# Patient Record
Sex: FEMALE | Race: BLACK OR AFRICAN AMERICAN | NOT HISPANIC OR LATINO | Employment: UNEMPLOYED | URBAN - METROPOLITAN AREA
[De-identification: names, ages, dates, MRNs, and addresses within clinical notes are randomized per-mention and may not be internally consistent; named-entity substitution may affect disease eponyms.]

---

## 2018-07-11 ENCOUNTER — OFFICE VISIT (OUTPATIENT)
Dept: INTERNAL MEDICINE | Facility: CLINIC | Age: 38
End: 2018-07-11

## 2018-07-11 VITALS
HEIGHT: 59 IN | DIASTOLIC BLOOD PRESSURE: 100 MMHG | WEIGHT: 247 LBS | BODY MASS INDEX: 49.8 KG/M2 | SYSTOLIC BLOOD PRESSURE: 160 MMHG

## 2018-07-11 DIAGNOSIS — M79.672 CHRONIC FOOT PAIN, LEFT: Primary | ICD-10-CM

## 2018-07-11 DIAGNOSIS — G89.29 CHRONIC FOOT PAIN, LEFT: Primary | ICD-10-CM

## 2018-07-11 PROCEDURE — 99203 OFFICE O/P NEW LOW 30 MIN: CPT | Performed by: FAMILY MEDICINE

## 2018-07-11 NOTE — PATIENT INSTRUCTIONS
Thank you for coming in today.  Please bring your medical records from your previous primary care provider.  I have ordered an orthopedic surgery referral for you.  You will be called to set up an appointment with this specialist.  Please follow-up as indicated.  Return to the clinic sooner if you have any other concerns.

## 2018-07-11 NOTE — PROGRESS NOTES
Subjective   Solange Archer is a 37 y.o. female who presents to the clinic to establish care and for complaint of Foot Pain      History of Present Illness  She reports that her previous PCP was Macie Matt NP at [facility].  Transferred care due to previous PCP relocating.  Specialists include: None  Prescription medications include: None  OTC medications include: Zyrtec, Tylenol    Patient reports complaint of chronic left foot pain, starting about one year ago after an accidental injury.  Reports pain on the dorsal surface of her foot that occurred acutely after a walker fell on her foot.  Pain is intermittent and worsens with prolonged walking.  Reports associated intermittent swelling, but denies associated erythema, bruising, numbness, weakness.  States that her last PCP ordered an xray of her left foot, which was reportedly unremarkable.  Has been taking Tylenol or using Icy Hot, which helps.  Has also used a foot brace, which also helps.    Review of Systems   Constitutional: Negative for chills, fatigue and fever.   HENT: Negative for congestion, ear pain, rhinorrhea, sinus pressure and sore throat.    Eyes: Negative for pain and visual disturbance.   Respiratory: Negative for cough and shortness of breath.    Cardiovascular: Negative for chest pain and palpitations.   Gastrointestinal: Negative for abdominal pain, constipation, diarrhea, nausea and vomiting.   Genitourinary: Negative for decreased urine volume, dysuria and hematuria.   Musculoskeletal: Negative for back pain and gait problem.        Positive for chronic left foot pain.   Skin: Negative for color change, pallor and rash.   Neurological: Negative for dizziness, syncope, weakness, numbness and headaches.   Psychiatric/Behavioral: The patient is not nervous/anxious.         Negative for depressed mood.     All other systems reviewed and are negative.    Past Medical History:   Diagnosis Date   • Seasonal allergies        Family History  "  Problem Relation Age of Onset   • Heart attack Mother    • Hypertension Mother    • Migraines Sister    • Diabetes Other    • Migraines Other        History reviewed. No pertinent surgical history.    Social History     Social History   • Marital status: Single     Spouse name: N/A   • Number of children: N/A   • Years of education: N/A     Occupational History   • Not on file.     Social History Main Topics   • Smoking status: Never Smoker   • Smokeless tobacco: Never Used   • Alcohol use No   • Drug use: No   • Sexual activity: No     Other Topics Concern   • Not on file     Social History Narrative   • No narrative on file       No current outpatient prescriptions on file.    Objective   /100   Ht 149.9 cm (59\")   Wt 112 kg (247 lb)   LMP 07/02/2018   BMI 49.89 kg/m²      Physical Exam   Constitutional: She is oriented to person, place, and time. She appears well-developed and well-nourished.   No acute distress.   HENT:   Head: Normocephalic and atraumatic.   Right Ear: External ear normal.   Left Ear: External ear normal.   Nose: Nose normal.   Eyes: Conjunctivae and EOM are normal.   Neck: Normal range of motion. Neck supple.   Cardiovascular: Normal rate, regular rhythm, normal heart sounds and intact distal pulses.    Pulses:       Dorsalis pedis pulses are 2+ on the left side.   Pulmonary/Chest: Effort normal and breath sounds normal. No respiratory distress. She has no wheezes.   Abdominal: Soft. There is no tenderness.   Musculoskeletal: Normal range of motion.   Normal gait.        Neurological: She is alert and oriented to person, place, and time.   Skin: Skin is warm and dry. No bruising noted. No erythema.   Psychiatric: She has a normal mood and affect. Her behavior is normal.   Vitals reviewed.      Assessment/Plan   Solange was seen today for foot pain.    Diagnoses and all orders for this visit:    Chronic foot pain, left  -     Ambulatory Referral to Orthopedic Surgery    Considering the " chronicity of her symptoms and reported unremarkable left foot xray, will order a referral for orthopedic surgery today to aid with further evaluation and management.

## 2018-08-17 ENCOUNTER — OFFICE VISIT (OUTPATIENT)
Dept: ORTHOPEDIC SURGERY | Facility: CLINIC | Age: 38
End: 2018-08-17

## 2018-08-17 VITALS — BODY MASS INDEX: 48.04 KG/M2 | WEIGHT: 244.71 LBS | OXYGEN SATURATION: 98 % | HEART RATE: 64 BPM | HEIGHT: 60 IN

## 2018-08-17 DIAGNOSIS — M79.672 LEFT FOOT PAIN: Primary | ICD-10-CM

## 2018-08-17 PROCEDURE — 99204 OFFICE O/P NEW MOD 45 MIN: CPT | Performed by: PHYSICIAN ASSISTANT

## 2018-08-17 RX ORDER — ACETAMINOPHEN 500 MG
500 TABLET ORAL EVERY 6 HOURS PRN
COMMUNITY

## 2018-08-17 RX ORDER — IBUPROFEN 200 MG
200 TABLET ORAL EVERY 6 HOURS PRN
COMMUNITY

## 2018-08-17 NOTE — PROGRESS NOTES
[facility] [place] [facility] [place]    Subjective     Patient: Solange Archer  : 1980    Primary Care Provider: Alexandra Simpson MD    Requesting Provider: As above    Pain and Edema of the Left Foot      History    Chief Complaint: Left dorsal foot pain    History of Present Illness: This is a very pleasant 37-year-old female here with her mother with complaints of left dorsal foot pain since her grandmother's walker fell on it one year ago.  She reports pain 8-10/10 with weightbearing and walking as well as aching and sharp rest pain and night pain.  Initially, there was swelling and ecchymosis but that has resolved.  At the time of injury, she had negative x-rays at [facility].  She reports swelling by the end of the day without any erythema or warmth at this time.  She is here for further evaluation and treatment.  She is treated with icing had packs without improved pain.    No current outpatient prescriptions on file prior to visit.     No current facility-administered medications on file prior to visit.       No Known Allergies   Past Medical History:   Diagnosis Date   • Seasonal allergies      No past surgical history on file.  Family History   Problem Relation Age of Onset   • Heart attack Mother    • Hypertension Mother    • Migraines Sister    • Diabetes Other    • Migraines Other    • Stroke Father       Social History     Social History   • Marital status: Single     Spouse name: N/A   • Number of children: N/A   • Years of education: N/A     Occupational History   • Not on file.     Social History Main Topics   • Smoking status: Never Smoker   • Smokeless tobacco: Never Used   • Alcohol use No   • Drug use: No   • Sexual activity: No     Other Topics Concern   • Not on file     Social History Narrative   • No narrative on file        Review of Systems   Constitutional: Negative.    HENT: Negative.    Eyes: Negative.    Respiratory: Negative.    Cardiovascular:  "Negative.    Gastrointestinal: Negative.    Endocrine: Negative.    Genitourinary: Negative.    Musculoskeletal: Positive for arthralgias and joint swelling.   Skin: Negative.    Allergic/Immunologic: Positive for environmental allergies.   Hematological: Negative.    Psychiatric/Behavioral: Negative.        The following portions of the patient's history were reviewed and updated as appropriate: allergies, current medications, past family history, past medical history, past social history, past surgical history and problem list.      Objective      Physical Exam  Pulse 64   Ht 151.1 cm (59.5\")   Wt 111 kg (244 lb 11.4 oz)   SpO2 98%   BMI 48.60 kg/m²     Body mass index is 48.6 kg/m².    GENERAL: Body habitus: morbidly obese    Lower extremity edema: Left: trace; Right: trace    Varicose veins:  Left: mild; Right: mild    Gait: antalgic     Mental Status:  awake and alert; oriented to person, place, and time    Voice:  clear  SKIN:  Normal    Hair Growth:  Right:normal; Left:  normal  HEENT: Head: Normocephalic, atraumatic,  without obvious abnormality.  eye: normal external eye, no icterus   PULM:  Repiratory effort normal    Ortho Exam  V:  Dorsalis Pedis:  Right: 2+; Left:2+    Posterior Tibial: Right:2+; Left:2+    Capillary Refill:  Brisk  MSK:  Hand:right handed      Tibia:  Right:  non tender; Left:  non tender      Ankle:  Right: non tender; Left:  non tender      Foot:  Right:  non tender, ROM  normal and motor function  normal; Left:  tender Over the midfoot with no swelling warmth or erythema, ROM  normal and motor function  normal      NEURO: Heel Walking:  Right:  normal; Left:  normal    Toe Walking:  Right:  normal; Left:  limited ability, painful     [place] 5.07 monofilament test: normal    Lower extremity sensation: intact     Calf Atrophy:none    Motor Function: all 5/5          Medical Decision Making    Data Review:   ordered and reviewed x-rays today    Assessment:  1. Left foot " pain        Plan:  Left foot pain.  I reviewed today's x-rays of both the left and right foot for comparison and clinical findings with the patient.  On exam, she is tender over the left midfoot with normal motion and strength.  She has pain with coming up on her toes.  X-rays today show no fracture or anything more worrisome.  When we took views of the left foot it did show some concern for widening between the first and second metatarsal, however, the right is symmetric so this is normal for her.  I explained to this to the patient and her much mother.  I explained that there is nothing worrisome on x-ray or exam.  We discussed further treatment options including physical therapy, Voltaren gel or further imaging.  Patient would like to try the Voltaren gel as well as get an MRI of the left foot given the long-term pain she has had.  Plan today is MRI of the left foot she'll return following the MRI for further treatment recommendations.  In the meantime, activity modification and Voltaren gel.  Beckie Franks PA-C  08/17/18  11:42 AM

## 2018-08-22 ENCOUNTER — HOSPITAL ENCOUNTER (OUTPATIENT)
Dept: MRI IMAGING | Facility: HOSPITAL | Age: 38
Discharge: HOME | End: 2018-08-22

## 2018-08-22 DIAGNOSIS — M79.672 LEFT FOOT PAIN: ICD-10-CM
